# Patient Record
Sex: MALE | Race: ASIAN
[De-identification: names, ages, dates, MRNs, and addresses within clinical notes are randomized per-mention and may not be internally consistent; named-entity substitution may affect disease eponyms.]

---

## 2019-07-20 ENCOUNTER — HOSPITAL ENCOUNTER (EMERGENCY)
Dept: HOSPITAL 91 - FTE | Age: 69
Discharge: HOME | End: 2019-07-20
Payer: COMMERCIAL

## 2019-07-20 ENCOUNTER — HOSPITAL ENCOUNTER (EMERGENCY)
Dept: HOSPITAL 10 - FTE | Age: 69
Discharge: HOME | End: 2019-07-20
Payer: COMMERCIAL

## 2019-07-20 VITALS — DIASTOLIC BLOOD PRESSURE: 71 MMHG | RESPIRATION RATE: 18 BRPM | SYSTOLIC BLOOD PRESSURE: 148 MMHG | HEART RATE: 76 BPM

## 2019-07-20 VITALS
HEIGHT: 66 IN | BODY MASS INDEX: 25.51 KG/M2 | HEIGHT: 66 IN | WEIGHT: 158.73 LBS | WEIGHT: 158.73 LBS | BODY MASS INDEX: 25.51 KG/M2
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
## 2019-07-20 DIAGNOSIS — S60.511A: Primary | ICD-10-CM

## 2019-07-20 PROCEDURE — 72040 X-RAY EXAM NECK SPINE 2-3 VW: CPT

## 2019-07-20 PROCEDURE — 73130 X-RAY EXAM OF HAND: CPT

## 2019-07-20 PROCEDURE — 73110 X-RAY EXAM OF WRIST: CPT

## 2019-07-20 PROCEDURE — 29125 APPL SHORT ARM SPLINT STATIC: CPT

## 2019-07-20 PROCEDURE — 99284 EMERGENCY DEPT VISIT MOD MDM: CPT
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
